# Patient Record
Sex: FEMALE | Race: WHITE | NOT HISPANIC OR LATINO | URBAN - METROPOLITAN AREA
[De-identification: names, ages, dates, MRNs, and addresses within clinical notes are randomized per-mention and may not be internally consistent; named-entity substitution may affect disease eponyms.]

---
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
## 2020-01-14 ENCOUNTER — COMMUNICATION - HEALTHEAST (OUTPATIENT)
Dept: PEDIATRICS | Facility: CLINIC | Age: 20
End: 2020-01-14

## 2020-01-14 DIAGNOSIS — F41.9 ANXIETY: ICD-10-CM

## 2020-01-14 RX ORDER — ESCITALOPRAM OXALATE 20 MG/1
20 TABLET ORAL DAILY
Qty: 90 TABLET | Refills: 0 | Status: SHIPPED | OUTPATIENT
Start: 2020-01-14

## 2020-04-02 ENCOUNTER — COMMUNICATION - HEALTHEAST (OUTPATIENT)
Dept: INTERNAL MEDICINE | Facility: CLINIC | Age: 20
End: 2020-04-02

## 2020-04-02 DIAGNOSIS — R41.840 CONCENTRATION DEFICIT: ICD-10-CM

## 2020-04-02 RX ORDER — DEXTROAMPHETAMINE SACCHARATE, AMPHETAMINE ASPARTATE MONOHYDRATE, DEXTROAMPHETAMINE SULFATE AND AMPHETAMINE SULFATE 1.25; 1.25; 1.25; 1.25 MG/1; MG/1; MG/1; MG/1
5 CAPSULE, EXTENDED RELEASE ORAL DAILY
Qty: 30 CAPSULE | Refills: 0 | Status: SHIPPED | OUTPATIENT
Start: 2020-04-02

## 2021-05-26 ASSESSMENT — PATIENT HEALTH QUESTIONNAIRE - PHQ9: SUM OF ALL RESPONSES TO PHQ QUESTIONS 1-9: 16

## 2021-05-28 ASSESSMENT — ANXIETY QUESTIONNAIRES: GAD7 TOTAL SCORE: 7

## 2021-05-30 VITALS — WEIGHT: 122 LBS | HEIGHT: 63 IN | BODY MASS INDEX: 21.62 KG/M2

## 2021-05-30 VITALS — WEIGHT: 123 LBS | BODY MASS INDEX: 21.96 KG/M2

## 2021-05-31 VITALS — BODY MASS INDEX: 22.14 KG/M2 | WEIGHT: 124 LBS

## 2021-06-01 VITALS — WEIGHT: 121.3 LBS | HEIGHT: 63 IN | BODY MASS INDEX: 21.49 KG/M2

## 2021-06-01 VITALS — WEIGHT: 121 LBS | BODY MASS INDEX: 21.44 KG/M2 | HEIGHT: 63 IN

## 2021-06-01 VITALS — BODY MASS INDEX: 22.69 KG/M2 | WEIGHT: 127.06 LBS

## 2021-06-01 NOTE — TELEPHONE ENCOUNTER
Medication Question or Clarification  Who is calling: Pharmacy: [facility]  What medication are you calling about? (include dose and sig) Escitalopram 20 mg, one tablet daily  Who prescribed the medication?: Nora Meza MD   What is your question/concern?: Could patient have a ninety day supply?  Pharmacy: [id number]  Okay to leave a detailed message?: Yes  Site CMT - Please call the pharmacy to obtain any additional needed information.

## 2021-06-01 NOTE — TELEPHONE ENCOUNTER
RN cannot approve Refill Request    RN can NOT refill this medication Protocol failed and NO refill given.         Christianne DOSHI Jeaneth, [facility] Triage/Med Refill 10/1/2019    Requested Prescriptions   Pending Prescriptions Disp Refills     escitalopram oxalate (LEXAPRO) 20 MG tablet 90 tablet 1     Sig: Take 1 tablet (20 mg total) by mouth daily.       SSRI Refill Protocol  Failed - 10/1/2019  3:35 PM        Failed - Age 21 and younger route to prescribing provider     Last office visit with prescriber/PCP: 1/10/2019 Nora Meza MD OR same dept: 1/10/2019 Nora Meza MD OR same specialty: 1/10/2019 Nora Meza MD  Last physical: 6/5/2018 Last MTM visit: Visit date not found   Next visit within 3 mo: Visit date not found  Next physical within 3 mo: Visit date not found  Prescriber OR PCP: Nora Meza MD  Last diagnosis associated with med order: 1. Anxiety  - escitalopram oxalate (LEXAPRO) 20 MG tablet; Take 1 tablet (20 mg total) by mouth daily.  Dispense: 90 tablet; Refill: 1    If protocol passes may refill for 12 months if within 3 months of last provider visit (or a total of 15 months).             Passed - PCP or prescribing provider visit in last year     Last office visit with prescriber/PCP: 1/10/2019 Nora Meza MD OR same dept: 1/10/2019 Nora Meza MD OR same specialty: 1/10/2019 Nora Meza MD  Last physical: 6/5/2018 Last MTM visit: Visit date not found   Next visit within 3 mo: Visit date not found  Next physical within 3 mo: Visit date not found  Prescriber OR PCP: Nora Meza MD  Last diagnosis associated with med order: 1. Anxiety  - escitalopram oxalate (LEXAPRO) 20 MG tablet; Take 1 tablet (20 mg total) by mouth daily.  Dispense: 90 tablet; Refill: 1    If protocol passes may refill for 12 months if within 3 months of last provider visit (or a total of 15 months).

## 2021-06-02 VITALS — BODY MASS INDEX: 22.09 KG/M2 | HEIGHT: 63 IN | WEIGHT: 124.7 LBS

## 2021-06-02 NOTE — PROGRESS NOTES
" [facility] Well Child Check    ASSESSMENT & PLAN  Chica Mckinney is a 19 y.o. who has normal growth and normal development.    Diagnoses and all orders for this visit:    Encounter for routine child health examination without abnormal findings  -     PHQ9 Depression Screen    Anxiety  Current mild episode of major depressive disorder without prior episode (H)  -     escitalopram oxalate (LEXAPRO) 20 MG tablet; Take 1 tablet (20 mg total) by mouth daily.  Dispense: 90 tablet; Refill: 0  -     Thyroid Stimulating Hormone (TSH)  -     T4, Free  - symptoms have worsened. Will check thyroid levels as advised by therapist.   -  Continue therapy  -   Advised change in medication. Barbra wants to wait a few months until she has a semester off as she has always been \"sensitive to meds.\"      Concentration deficit  Dyscalculia  Executive function deficit  -     dextroamphetamine-amphetamine (ADDERALL XR) 5 MG 24 hr capsule; Take 1 capsule (5 mg total) by mouth daily.  Dispense: 30 capsule; Refill: 0  -      Will fax 3 months  -     Thyroid Stimulating Hormone (TSH)  -     T4, Free  -     ARIANE signed for [facility] where she was previously getting her meds      Recurrent infections  -     HM1(CBC and Differential)  -     Immunoglobulin A  -     Immunoglobulin M  -     Immunoglobulin G    Uses oral contraception  -     norethindrone-ethinyl estradiol (JUNEL FE 1/20, 28,) 1 mg-20 mcg (21)/75 mg (7) per tablet; Take 1 tablet by mouth daily.  Dispense: 84 tablet; Refill: 4  -      ARIANE signed for [facility] for testing (chylamydia)     URI - advised to call if not improving in next week for possible sinus tx    Return to clinic within 5 months for a med check  or sooner as needed    IMMUNIZATIONS/LABS  Discussed need for HPV, Influenza, Hep A and option of Bexsero  We do not have record of Tdap - per previous visit - mom says she got this in [place] but we have not record/date  Chiac declined vaccines " "today    REFERRALS  Dental:  The patient has already established care with a dentist.  Other:  No referrals were made at this time.    ANTICIPATORY GUIDANCE  I have reviewed age appropriate anticipatory guidance.  Social:  Friends, Need for Privacy, Extracurricular Activities and Changes and Choices  Parenting:  Support, [place]/Dependence, Homework and Confidential Health Care  Nutrition:  Body Image  Play and Communication:  Appropriate Use of TV, Hobbies and Creative Talents  Safety:  Seat Belts, Swimming Safety, Contact Sports, Recreational vehicles, Bike/Motorcycle Helmets, Safe storage of Weapons and Outdoor Safety Avoiding Sun Exposure    HEALTH HISTORY  Do you have any concerns that you'd like to discuss today?: mood and sickness     Cold: She reports having recurrent colds since starting college last fall. Her current cold began 1 week ago. She endorses nasal symptoms and coughing. She denies having lingering coughs between colds. She has HSV fever blister last year. She had a breakout last cold season but has not had another since then. She had strep, an ear infection, \"flu\" last year. She is attempting to sleep 8 hours a night. She is living in a house with two other people. She denies having family history of immune issues.     Mood: She is struggling with school this year. She has been feeling overwhelmed and stressed with picking a major - currently undecided. She was close to being kicked out of school year. She was struggling with attending classes due to her mood. She was prescribed 5mg Adderall XR by [facility].  She typically doses  Adderall as needed for classes but is currently not taking it due to refill conflicts. She is having trouble getting an appointment at that clinic. She was told she could take 2 per day if needed. She is currently taking 20mg Lexapro with little improvement. She has been on that since 2/2016. She reports being sensitive to medications and is hesitant in " "switching to others. She is currently following a therapist, Elma Root, for this. They do tele-therapy. Lexapro was in her \"yellow zone\" on [email or web address] testing in the past.    Review of Systems:   Neuro: She is not taking 100mg gabapentin for her headaches. She reports not having headaches as much. She typically doses OTC meds for her migraines. She has been prescribed a migraine medication but is hesitant to use that.  Dymenorrhea: She had an IUD inserted recently but was taken out to due installation error. She currently does not have an IUD. She has been taking birth control pill since taking out the IUD. Her last cycle was longer - lasting 2 weeks. The first week was very heavy whereas she had brown discharge the 2nd week. She is seeing a gynecologist, Maria E Patel at [facility], for birth control. She states she recently had STI testing including chlamydia and had HIV testing in the past.  All other systems are negative.     Roomed by: JW< [facility]         Do you have any significant health concerns in your family history?: No  Family History   Problem Relation Age of Onset     Hypertension Other         grandparents     Leukemia Other         aunt     Since your last visit, have there been any major changes in your family, such as a move, job change, separation, divorce, or death in the family?: No  Has a lack of transportation kept you from medical appointments?: No    Home  Who lives in your home?:  See below   Social History     Patient does not qualify to have social determinant information on file (likely too young).   Social History Narrative    Lives with parents and younger brother, Lonny    Older sister in college     Do you have any concerns about losing your housing?: No  Is your housing safe and comfortable?: Yes  Do you have any trouble with sleep?:  Yes    Education  What school do you child attend?:  [employer]   What grade are you in?:  [employer]   How do you perform in " "school (grades, behavior, attention, homework?: \"Not doing very well\"  She does not have classes on Thursdays and Fridays.     Eating  Do you eat regular meals including fruits and vegetables?:  yes  What are you drinking (cow's milk, water, soda, juice, sports drinks, energy drinks, etc)?: water, juice and almond milk   Have you been worried that you don't have enough food?: No  Do you have concerns about your body or appearance?:  No    Activities  Do you have friends?:  yes  Do you get at least one hour of physical activity per day?:  yes  How many hours a day are you in front of a screen other than for schoolwork (computer, TV, phone)?:  5  What do you do for exercise?:  Walk   Do you have interest/participate in community activities/volunteers/school sports?:  no    MENTAL HEALTH SCREENING  PHQ-2 Total Score: 6 (10/24/2019  9:35 AM)  Depression Follow-up Plan: patient follow-up to return when and if necessary (10/24/2019  9:35 AM)    PHQ-9 Total Score: 16 (10/24/2019  9:35 AM) 16 increaesd from 9  Little interest or pleasure in doing things: Nearly every day  Feeling down, depressed, or hopeless: Nearly every day  Trouble falling or staying asleep, or sleeping too much: Nearly every day  Feeling tired or having little energy: Nearly every day  Poor appetite or overeating: Several days  Feeling bad about yourself - or that you are a failure or have let yourself or your family down: Several days  Trouble concentrating on things, such as reading the newspaper or watching television: More than half the days  Moving or speaking so slowly that other people could have noticed. Or the opposite - being so fidgety or restless that you have been moving around a lot more than usual: Not at all  Thoughts that you would be better off dead, or of hurting yourself in some way: Not at all  PHQ-9 Total Score: 16  If you checked off any problems, how difficult have these problems made it for you to do your work, take care of " things at home, or get along with other people?: Very difficult  Depression Follow-up Plan: patient follow-up to return when and if necessary     BASILIO-7 Screening Results: 7 increased from 2  How difficult did these problems make it for you to do your work, take care of things at home or get along with other people? : Very difficult (10/24/2019  9:00 AM)  Feeling nervous, anxious, or on edge: 3 (10/24/2019  9:00 AM)  Not being able to stop or control worryin (10/24/2019  9:00 AM)  Worrying too much about different things: 2 (10/24/2019  9:00 AM)  Trouble relaxin (10/24/2019  9:00 AM)  Being so restless that it's hard to sit still: 0 (10/24/2019  9:00 AM)  Becoming easily annoyed or irritable: 0 (10/24/2019  9:00 AM)  Feeling afraid as if something awful might happen: 0 (10/24/2019  9:00 AM)  BASILIO 7 Total Score: 7 (10/24/2019  9:00 AM)  How difficult did these problems make it for you to do your work, take care of things at home or get along with other people? : Very difficult (10/24/2019  9:00 AM)      VISION/HEARING  Vision: Patient is already followed by a vision specialist  Hearing:  Not done - normal 2018 at 18 year visit - no concerns    No exam data present    TB Risk Assessment:  The patient and/or parent/guardian answer positive to:  no known risk of TB    Dyslipidemia Risk Screening  Have either of your parents or any of your grandparents had a stroke or heart attack before age 55?: No  Any parents with high cholesterol or currently taking medications to treat?: Yes: Dad     Dental  When was the last time you saw the dentist?: over 12 months ago       Patient Active Problem List   Diagnosis     Anxiety     School avoidance     Executive function deficit     Dyscalculia     Concentration deficit     Depression     Migraine headache     Acne     Chronic fatigue       Drugs  Does the patient use tobacco/alcohol/drugs?:  no    Safety  Does the patient have any safety concerns (peer or home)?:  no  Does  "the patient use safety belts, helmets and other safety equipment?:  yes    Sex  Have you ever had sex?:  Yes - currently has one active partner - not in a relationship. Previous partners. She has been recently screened and negative for STIs.     MEASUREMENTS  Height:  5' 3\" (1.6 m)  Weight: 136 lb 4.8 oz (61.8 kg)  BMI: Body mass index is 24.14 kg/m .  Blood Pressure: 125/77  Blood pressure percentiles are not available for patients who are 18 years or older.    PHYSICAL EXAM  Constitutional: She appears well-developed and well-nourished. Somewhat tired appearing.  HEENT: Head: Normocephalic.    Right Ear: Tympanic membrane, external ear and canal normal.    Left Ear: Tympanic membrane, external ear and canal normal.    Nose: Nasal congestion. Mild maxillary tenderness.   Mouth/Throat: Mucous membranes are moist. Oropharynx is clear.    Eyes: Conjunctivae and lids are normal. Pupils are equal, round, and reactive to light.   Neck: Neck supple. No tenderness is present. Normal thyroid  Cardiovascular: Normal rate and regular rhythm. No murmur heard.  Pulses: Femoral pulses are 2+ bilaterally.   Pulmonary/Chest: Effort normal and breath sounds normal. There is normal air entry. Breast development is normal.  Thad stage 5.   Abdominal: Soft. There is no hepatosplenomegaly. No inguinal hernia.   Musculoskeletal: Normal range of motion. Normal strength and tone. No abnormalities. Spine is straight. Normal duck walk.  Normal heel to toe walk.   : deferred  Neurological: She is alert. She has normal reflexes. Gait normal.   Psychiatric: She has a normal mood and affect. Her speech is normal and behavior is normal.  Skin: No rashes. Mild inflammatory facial acne.    ADDITIONAL HISTORY SUMMARIZED (2): reviewed visits for strep/AOM  DECISION TO OBTAIN EXTRA INFORMATION (1): ARIANE signed for gyne and neuro records   RADIOLOGY TESTS (1): None.  LABS (1): Labs ordered today.  MEDICINE TESTS (1): PHQ9 administered.  INDEPENDENT " REVIEW (2 each): None.     The visit lasted a total of 46 minutes face to face with the patient. Over 25 minutes was spent in counseling regarding mood/attention, frequent infections.    I, Sandra Neal, am scribing for and in the presence of, Dr. Meza.    I, Dr. Meza, personally performed the services described in this documentation, as scribed by Sandra Neal in my presence, and it is both accurate and complete.    Total data points: 5

## 2021-06-03 VITALS
HEIGHT: 63 IN | BODY MASS INDEX: 24.15 KG/M2 | SYSTOLIC BLOOD PRESSURE: 125 MMHG | WEIGHT: 136.3 LBS | DIASTOLIC BLOOD PRESSURE: 77 MMHG

## 2021-06-05 NOTE — TELEPHONE ENCOUNTER
RN cannot approve Refill Request    RN can NOT refill this medication Protocol failed and NO refill given.      Christianne Eric, [facility] Triage/Med Refill 1/14/2020    Requested Prescriptions   Pending Prescriptions Disp Refills     escitalopram oxalate (LEXAPRO) 20 MG tablet 90 tablet 0     Sig: Take 1 tablet (20 mg total) by mouth daily.       SSRI Refill Protocol  Failed - 1/14/2020  7:21 AM        Failed - Age 21 and younger route to prescribing provider     Last office visit with prescriber/PCP: 1/10/2019 Nora Meza MD OR same dept: Visit date not found OR same specialty: 1/10/2019 Nora Meza MD  Last physical: 10/24/2019 Last MTM visit: Visit date not found   Next visit within 3 mo: Visit date not found  Next physical within 3 mo: Visit date not found  Prescriber OR PCP: Nora Meza MD  Last diagnosis associated with med order: 1. Anxiety  - escitalopram oxalate (LEXAPRO) 20 MG tablet; Take 1 tablet (20 mg total) by mouth daily.  Dispense: 90 tablet; Refill: 0    If protocol passes may refill for 12 months if within 3 months of last provider visit (or a total of 15 months).             Passed - PCP or prescribing provider visit in last year     Last office visit with prescriber/PCP: 1/10/2019 Nora Meza MD OR same dept: Visit date not found OR same specialty: 1/10/2019 Nora Meza MD  Last physical: 10/24/2019 Last MTM visit: Visit date not found   Next visit within 3 mo: Visit date not found  Next physical within 3 mo: Visit date not found  Prescriber OR PCP: Nora Meza MD  Last diagnosis associated with med order: 1. Anxiety  - escitalopram oxalate (LEXAPRO) 20 MG tablet; Take 1 tablet (20 mg total) by mouth daily.  Dispense: 90 tablet; Refill: 0    If protocol passes may refill for 12 months if within 3 months of last provider visit (or a total of 15 months).

## 2021-06-07 NOTE — TELEPHONE ENCOUNTER
I talked with Jonna and gave the message below from Dr. Gardner.  Jonna needs to talk with her mom and she will let us know what the plan is.

## 2021-06-07 NOTE — TELEPHONE ENCOUNTER
One month sent. She is due for a med check prior to additional refills.  This can be a phone visit.  It looks like she is transitioning care to [facility] in [place]. Please clarify who she would like to handle the Adderall refills in the future.

## 2021-06-07 NOTE — TELEPHONE ENCOUNTER
Refill request received from [facility] via fax for a refill of Adderall. Order pended.  Tosha Araiza CMA ............... 9:43 AM, 04/02/20

## 2021-06-08 NOTE — PROGRESS NOTES
Name: Chica Mckinney  Age: 16 y.o.  Gender: female  : 2000  Date of Encounter: 2017    ASSESSMENT/PLAN:  1. Anxiety - doing well  - escitalopram oxalate (LEXAPRO) 20 MG tablet; Take 1 tablet (20 mg total) by mouth daily.  Dispense: 90 tablet; Refill: 1  - med check in 6 months  - letter write to allow for possible extra time with [facility]    Told mom that we still do not have Chica's immunization record from previous clinic      Chief Complaint   Patient presents with     Anxiety     Depression       HPI:  Chica Mckinney is a 16 y.o.  female who presents to the clinic, accompanied by mom, to follow up on her anxiety and depression. She is currently taking 20 MG of Lexapro daily. Her current dosage is working well. She discontinued meeting with her psychologist, Elma Huerta. She is doing well academically. She has missed 11 school days this year, including 5 half days due to headaches. Her headaches are overall improved. She is taking 2 on-line classes and is otherwise in the classroom. 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    ROS:  MS: She had a muscle strain in her calf that has healed.   Neuro: Headaches  Psych:       Little interest or pleasure in doing things: Not at all  Feeling down, depressed, or hopeless: Not at all  Trouble falling or staying asleep, or sleeping too much: Not at all  Feeling tired or having little energy: Several days  Poor appetite or overeating: Several days  Feeling bad about yourself - or that you are a failure or have let yourself or your family down: Not at all  Trouble concentrating on things, such as reading the newspaper or watching television: Not at all  Moving or speaking so slowly that other people could have noticed. Or the opposite - being so fidgety or restless that you have been moving around a lot more than usual: Not at all  Thoughts that you would be better off dead, or of hurting yourself in some way: Not at all  PHQ-9  "Total Score: 2  If you checked off any problems, how difficult have these problems made it for you to do your work, take care of things at home, or get along with other people?: Not difficult at all    Feeling nervous, anxious or on edge: 1  Not being able to stop or control worry: 0  Worrying too much about different things: 0  Trouble relaxin  Being so restless that is is hard to sit still: 0  Becoming easily annnoyed or irritable: 0  Feeling afraid as if something awful might happen: 0  BASILIO-7 Total: 1  How difficult did these problems make it for you to do your work, take care of things at home or get along with other people? : Not difficult at all      Past Med / Surg History:  Past Medical History:   Diagnosis Date     Arm fracture, right      Concussion      EBV seropositivity      BASILIO (generalized anxiety disorder) 3rd grade    on lexapro until 8th grade     School avoidance 3rd grade     Strain of calf muscle 2016       Fam / Soc History:  She is a gwendolyn in high school. She is taking Faroese and English online.     She is involved in several extracurricular activities. She is a manager for boys basketball, student , and she plays soccer.     Family History   Problem Relation Age of Onset     Hypertension       grandparents     Leukemia       aunt     Social History     Social History Narrative    Lives with parents and younger brother, Lonny    Older sister in college       Objective:  Vitals:   Visit Vitals     /76 (Patient Site: Right Arm, Patient Position: Sitting, Cuff Size: Adult Regular)     Pulse 72     Ht 5' 2.75\" (1.594 m)     Wt 122 lb (55.3 kg)     BMI 21.78 kg/m2     Wt Readings from Last 3 Encounters:   17 122 lb (55.3 kg) (51 %, Z= 0.03)*   16 124 lb (56.2 kg) (58 %, Z= 0.20)*   16 124 lb (56.2 kg) (59 %, Z= 0.22)*     * Growth percentiles are based on CDC 2-20 Years data.       PHYSICAL EXAM:  Gen: Alert, well appearing  Heart: Regular rate and rhythm; " normal S1 and S2; no murmurs, gallops, or rubs.  Lungs: Unlabored respirations; clear breath sounds.  Neuro: Oriented. Appropriate for age.  Psychiatric: Appropriate affect      DATA REVIEWED: 3 data points  Additional History from Old Records Summarized (2): ortho note 9/2016  Decision to Obtain Records (1): None  Radiology Tests Summarized or Ordered (1): None  Labs Reviewed or Ordered (1): None  Medicine Test Summarized or Ordered (1): PHQ-9  Independent Review of EKG, X-RAY, or RAPID STREP (2 each): None    The visit lasted a total of 11 minutes face to face with the patient. Over 50% of the time was spent counseling and educating the patient about anxiety and depression.    I, Ingrid Dobbs, am scribing for and in the presence of, Dr. Meza.    I, Dr. Meza, personally performed the services described in this documentation, as scribed by Ingrid Dobbs in my presence, and it is both accurate and complete.    Nora Meza MD  2/9/2017

## 2021-06-08 NOTE — PROGRESS NOTES
Name: Chica Mckinney  Age: 16 y.o.  Gender: female  : 2000  Date of Encounter: 2/15/2017    ASSESSMENT:  1. Sore throat   - Mononucleosis Screen is negative  - Sayra-Barr Virus (EBV) Antibody Profile is pending    PLAN:  Discussed negative mono screen with mom over the phone. Recommend symptomatic cares as her EBV titers are pending. Recommend tylenol and motrin for sore throat and headaches. Push fluids. Rest.   Will call with EBV antibody results in the next few days. If IgM is negative, may consider treating for tonsillitis if symptoms are still not improving. Mom is understanding and agreeable with plan.        CHIEF COMPLAINT:  Chief Complaint   Patient presents with     Sore Throat     hs strep test 2 weeks ago, was seen at [facility] clinic, improved, sx came back with white spots     Headache     Fatigue       HPI:  Chica Mckinney is a 16 y.o.  female who presents to the clinic with mom with concerns for sore throat. Symptoms started a couple weeks ago with throat pain and nasal congestion. The congestion cleared, but she presented to the [facility] 2 weeks ago when she developed a sandpaper rash on her chest. Her rapid strep test came back negative at that time. Her throat pain cleared for a couple days, but has returned with white spots on her tonsils. She has a history of intermittent headaches, but currently has headaches that she describes as constant. She is taking 400 mg ibuprofen for headaches with little relief. She is also very fatigued, taking naps after school and getting to bed earlier than usual. No fever or chills. Her appetite is decreased. Drinking fluids well. Urinating well. No vomiting or diarrhea. She is a manager for the school basketball team and a boy on the team was recently diagnosed with mono.     Past Med / Surg History: She has a history of migraines and is taking LexaPro for Anxiety and Depression.     Fam / Soc History: She plays soccer in the fall. As reviewed  above.    ROS:  Gen: As reviewed above.  ENT:  As reviewed above.  Resp: No SOB or wheezing. No cough.  GI: No diarrhea. No nausea or vomiting.  MS: As reviewed above.  Skin: sand paper rash as reviewed above  Neuro: As reviewed above.      Objective:  Vitals:   Visit Vitals     BP (!) 86/60 (Patient Site: Right Arm, Patient Position: Sitting, Cuff Size: Adult Small)     Temp 98.2  F (36.8  C) (Oral)     Wt 123 lb (55.8 kg)     LMP 02/01/2017     BMI 21.96 kg/m2     Wt Readings from Last 3 Encounters:   02/15/17 123 lb (55.8 kg) (53 %, Z= 0.08)*   02/09/17 122 lb (55.3 kg) (51 %, Z= 0.03)*   07/05/16 124 lb (56.2 kg) (58 %, Z= 0.20)*     * Growth percentiles are based on [facility] 2-20 Years data.       Gen: Alert, well appearing.  Eyes: Conjunctivae clear bilaterally. PERRL. EOMI.   ENT: Left TM pearly gray with visible bony landmarks and light reflex. Right TM pearly gray with visible bony landmarks and light reflex. No nasal congestion. No presence of nasal drainage. Oropharynx normal. Tonsils 2.5+ bilaterally with exudate. Posterior pharynx erythematous. Mucosa moist and intact.  Heart: Regular rate and rhythm; normal S1 and S2; no murmurs.  Lungs: Unlabored respirations. Clear breath sounds throughout with good air movement. No wheezes, crackles, or rhonchi.  Abdomen: Bowel sounds present. Abdomen is non-distended. Abdomen is soft and non-tender to palpation. No hepatosplenomegaly. No masses.   Skin: Normal without rash, lesions, or bruising.  Neuro: Appropriate for age.  Hematologic/Lymph/Immune:  No cervical lymphadenopathy.      Pertinent results / imaging:  Recent Results (from the past 24 hour(s))   Mononucleosis Screen   Result Value Ref Range    Mono Screen Negative Negative     DATA REVIEWED:  Additional History from Old Records Summarized (2): None  Decision to Obtain Records (1): None  Radiology Tests Summarized or Ordered (1): None  Labs Reviewed or Ordered (1): Labs ordered.  Medicine Test Summarized or  Ordered (1): None  Independent Review of EKG, X-RAY, or RAPID STREP (2 each): None    The visit lasted a total of 13 minutes face to face with the patient. Over 50% of the time was spent counseling and educating the patient about throat pain.    IDalila, am scribing for and in the presence of, RICKY Mueller.    I, RICKY Mueller, personally performed the services described in this documentation, as scribed by Dalila Carrillo in my presence, and it is both accurate and complete.    RICKY Mueller  Certified Pediatric Nurse Practitioner  [facility] [[email or web address]]  [phone]    Total Data Points: 1

## 2021-06-10 NOTE — PROGRESS NOTES
Name: Chica Mckinney  Age: 17 y.o.  Gender: female  : 2000  Date of Encounter: 2017    ASSESSMENT/PLAN:  1. Gastritis  - omeprazole (PRILOSEC) 20 MG capsule; Take 1 capsule (20 mg total) by mouth daily.  Dispense: 30 capsule; Refill: 2    Patient Instructions   Prilosec OTC 20 mg daily  Should see improvement within 7-10 days  If not benefit or worsening in 2 weeks - check back  Can use Tums 4 times daily as needed  If helpful, use for 4-6 week for healing, up to 2-3 months  Discussed abdominal US (r/o gallstones) lab work if persisting        Chief Complaint   Patient presents with     Abdominal Pain     decreased appetite past few weeks     Nausea     Nasal Congestion     X 3 weeks ago, improved       HPI:  Chica Mckinney is a 17 y.o.  female who presents to the clinic with her mother today with concerns of loss of appetite and nausea over the last month or so.  She comments that she had a bad cold about 3 weeks ago.  Her symptoms have resolved but she continues to feel more tired and her appetite has been decreased since that time.  She states in general food just does not sound good.  She will be a little bit more hungry around dinnertime.  Her stomach has been bothering her and does feel a bit worse after eating.  She has not had any vomiting but has felt somewhat nauseated.  She does have a tendency to have some constipation.  She has been in general stooling on a daily basis and she said that her stool was somewhat soft yesterday.  She did have some brief diarrhea one time recently.  She is not using any laxatives but has used MiraLAX in the past.  He does occasionally feel fluid coming back up into her throat from her stomach that she usually just swallows back.  She has not noticed any blood in her stool. She has taken some Tums without much improvement.    She has been more tired since her cold.  She has been taking a nap after school most days.  Her sleep overnight has been a bit  more restless.  She stayed home from school today because she did not feel well but this is the first day of school she is missed in a while.  She does continue to have some occasional headaches a few times per month but do not seem to cause much dysfunction.  She continues on Lexapro 20 mg daily.  She feels like her anxiety and mood overall has been well controlled. Mom does comment that there is some end of the year stress.    ROS:  Gen: No fever   Eyes: no eye itching  ENT: No nasal congestion or rhinorrhea. No pharyngitis.   Resp: No SOB, cough or wheezing.  GI: See HPI  :No dysuria. No hematuria. Regular menses - last was a bit lighter than usual at start  MS: Some back pain - chronic from prior injury  Neuro: See HPI  Psych: See HPI  No Known Allergies. No seasonal allergies    Past Med / Surg History:  Past Medical History:   Diagnosis Date     Arm fracture, right 2003     Concussion      EBV seropositivity      BASILIO (generalized anxiety disorder) 3rd grade    on lexapro until 8th grade     School avoidance 3rd grade     Strain of calf muscle 09/2016     Was on PPI around age 10 due to stomach issues - did seem to help  Did have a barium swallow - inconclusive for ZHANE    Fam / Soc History:  Hanging out this summer - no camp or sports  She has one on-line class    Family History   Problem Relation Age of Onset     Hypertension       grandparents     Leukemia       aunt     Social History     Social History Narrative    Lives with parents and younger brother, Lonny    Older sister in college       Objective:  Vitals: BP 98/70 (Patient Site: Right Arm, Patient Position: Sitting, Cuff Size: Adult Regular)  Pulse 80  Temp 98.5  F (36.9  C) (Oral)   Wt 124 lb (56.2 kg)  LMP 04/24/2017  Wt Readings from Last 3 Encounters:   05/23/17 124 lb (56.2 kg) (54 %, Z= 0.09)*   02/15/17 123 lb (55.8 kg) (53 %, Z= 0.08)*   02/09/17 122 lb (55.3 kg) (51 %, Z= 0.03)*     * Growth percentiles are based on CDC 2-20 Years data.        PHYSICAL EXAM:  Gen: Alert, somewhat fatigued  ENT: No nasal congestion or rhinorrhea. Oropharynx normal, moist mucosa.  TMs normal bilaterally. No facial tenderness  Eyes: Conjunctivae clear bilaterally.   Heart: Regular rate and rhythm; normal S1 and S2; no murmurs, gallops, or rubs.  Lungs: Unlabored respirations; clear breath sounds.  Abdomen: Soft, without organomegaly. Bowel sounds normal. Minimal LUQ tenderness. No masses palpable. No distention.  Musculoskeletal: No CVA tenderness  Skin: Mild acne  Neuro: Oriented.Appropriate for age.  Hematologic/Lymph/Immune: No cervical lymphadenopathy  Psychiatric: flatter affect    Pertinent results / imaging:  Results for orders placed or performed in visit on 02/15/17   Mononucleosis Screen   Result Value Ref Range    Mono Screen Negative Negative   Sayra-Barr Virus (EBV) Antibody Profile   Result Value Ref Range    EBV VCA IGM AB, S Negative Negative    EBV VCA IGG AB, S Positive Negative    EBNA AB, S Positive Negative    INTERPRETATION Results suggest past infection.        DATA REVIEWED:  Additional History from Old Records Summarized (2): 2/15/17 visit  Decision to Obtain Records (1): None  Radiology Tests Summarized or Ordered (1): None  Labs Reviewed or Ordered (1): mono testing above  Medicine Test Summarized or Ordered (1): None  Independent Review of EKG, X-RAY, or RAPID STREP (2 each): None    The visit lasted a total of 26 minutes face to face with the patient. Over 50% of the time was spent counseling and educating the patient about abdominal pain.        Nora Meza MD  5/23/2017

## 2021-06-17 NOTE — PATIENT INSTRUCTIONS - HE
Patient Instructions by Nora Meza MD at 10/24/2019  9:20 AM     Author: Nora Meza MD Service: -- Author Type: Physician    Filed: 10/24/2019 10:15 AM Encounter Date: 10/24/2019 Status: Addendum    : Nora Meza MD (Physician)    Related Notes: Original Note by Nroa Meza MD (Physician) filed at 10/24/2019 10:11 AM          Patient Education      [facility] HANDOUT- PATIENT  18 THROUGH 21 YEAR VISITS  Here are some suggestions from [employer] experts that may be of value to your family.     HOW YOU ARE DOING  Enjoy spending time with your family.  Find activities you are really interested in, such as sports, theater, or volunteering.  Try to be responsible for your schoolwork or work obligations.  Always talk through problems and never use violence.  If you get angry with someone, try to walk away.  If you feel unsafe in your home or have been hurt by someone, let us know. Hotlines and community agencies can also provide confidential help.  Talk with us if you are worried about your living or food situation. Community agencies and programs such as SNAP can help.  Dont smoke, vape, or use drugs. Avoid people who do when you can. Talk with us if you are worried about alcohol or drug use in your family.    YOUR DAILY LIFE  Visit the dentist at least twice a year.  Brush your teeth at least twice a day and floss once a day.  Be a healthy eater.  Have vegetables, fruits, lean protein, and whole grains at meals and snacks.  Limit fatty, sugary, salty foods that are low in nutrients, such as candy, chips, and ice cream.  Eat when youre hungry. Stop when you feel satisfied.  Eat breakfast.  Drink plenty of water.  Make sure to get enough calcium every day.  Have 3 or more servings of low-fat (1%) or fat-free milk and other low-fat dairy products, such as yogurt and cheese.  Women: Make sure to eat foods rich in folate, such as fortified grains and dark- green leafy vegetables.  Aim for  at least 1 hour of physical activity every day.  Wear safety equipment when you play sports.  Get enough sleep.  Talk with us about managing your health care and insurance as an adult.    YOUR FEELINGS  Most people have ups and downs. If you are feeling sad, depressed, nervous, irritable, hopeless, or angry, let us know or reach out to another health care professional.  Figure out healthy ways to deal with stress.  Try your best to solve problems and make decisions on your own.  Sexuality is an important part of your life. If you have any questions or concerns, we are here for you.    HEALTHY BEHAVIOR CHOICES  Avoid using drugs, alcohol, tobacco, steroids, and diet pills. Support friends who choose not to use.  If you use drugs or alcohol, let us know or talk with another trusted adult about it. We can help you with quitting or cutting down on your use.  Make healthy decisions about your sexual behavior.  If you are sexually active, always practice safe sex. Always use birth control along with a condom to prevent pregnancy and sexually transmitted infections.  All sexual activity should be something you want. No one should ever force or try to convince you.  Protect your hearing at work, home, and concerts. Keep your earbud volume down.    STAYING SAFE  Always be a safe and cautious .  Insist that everyone use a lap and shoulder seat belt.  Limit the number of friends in the car and avoid driving at night.  Avoid distractions. Never text or talk on the phone while you drive.  Do not ride in a vehicle with someone who has been using drugs or alcohol.  If you feel unsafe driving or riding with someone, call someone you trust to drive you.  Wear helmets and protective gear while playing sports. Wear a helmet when riding a bike, a motorcycle, or an ATV or when skiing or skateboarding.  Always use sunscreen and a hat when youre outside.  Fighting and carrying weapons can be dangerous. Talk with your parents,  teachers, or doctor about how to avoid these situations.      Helpful Resources:  National Domestic Violence Hotline: 990.533.5655   Consistent with Bright Futures: Guidelines for Health Supervision of Infants, Children, and Adolescents, 4th Edition  For more information, go to https://brightfutures.aap.org.           Adderall XR 5 mg - sent today with fill dates of 10/24, 11/23, 12/23    Return to discuss switch off of lexapro

## 2021-06-17 NOTE — PROGRESS NOTES
CONFIDENTIAL ADOLESCENT VISIT - DO NOT SHARE TEST RESULTS OR INFORMATION WITH PATIENT'S PARENTS - [facility] CELL PHONE -[phone]       Name: Chica Mckinney  Age: 18 y.o.  Gender: female  : 2000  Date of Encounter: 2018    ASSESSMENT:  1. Frequent urination - UA with trace LE, otherwise normal. UC in process. Wet prep is negative. Pregnancy test negative. Chlamydia trachomatis & Neisseria gonorrhoeae screen in process.       PLAN:  UA is not indicative of UTI. Discussed possible vulvovaginitis and recommend supportive cares while UC is pending.   Continue to push fluids.  Tylenol or advil for fever or pain.    Should be seen again if develops fevers (temp >100.8), worsening of urine symptoms including hematuria, worsening of diarrhea or vomiting.   Please call Jonna's cell phone if UC or STI screening is positive: [facility] CELL PHONE -[phone]           CHIEF COMPLAINT:  Chief Complaint   Patient presents with     possible uti     for two days, irritation, not feeling right, waking up sweating        HPI:  Chica Mckinnye is a 18 y.o.  female who presents to the clinic for evaluation of a possible UTI. Over the past 3 days she has had increased urinary frequency and irritation. Has been sweaty overnight, but no known fever or chills. No hematuria. Has had urinary hesitancy. Has intermittent lower abdomen cramping. Had 2 loose stools yesterday. Felt nauseated, but no vomiting. Is eating and drinking well. Is sexually active and most recently had intercourse 1 week ago. She did not urinate after intercourse. Is on OCPs and uses a condom for birth control and prevention of STI. Has one male partner. Last STI testing was 1 year ago at her gynecologist's office. Most recent period was 2 weeks ago. Denies itching. No changes or odorous discharge. No history of UTI's. Has not taken medication for discomfort with urination.     Past Med / Surg History:   Patient Active Problem List   Diagnosis      Anxiety     School avoidance     Executive function deficit     Dyscalculia     Concentration deficit     Depression     Migraine headache     Acne     Fam / Soc History: as reviewed    ROS:  Gen: No fever or fatigue  ENT:  No pharyngitis.   Resp:  No cough.  GI: as reviewed above  : as reviewed above  Skin: No rashes  Neuro: history of migraines      Objective:  Vitals: BP 95/60 (Patient Site: Right Arm, Patient Position: Sitting)  Pulse 68  Temp 98.7  F (37.1  C) (Oral)   Resp 20  Wt 127 lb 1 oz (57.6 kg)  SpO2 99%  Wt Readings from Last 3 Encounters:   04/13/18 127 lb 1 oz (57.6 kg) (56 %, Z= 0.14)*   05/23/17 124 lb (56.2 kg) (54 %, Z= 0.09)*   02/15/17 123 lb (55.8 kg) (53 %, Z= 0.08)*     * Growth percentiles are based on [facility] 2-20 Years data.       Gen: Alert, well appearing  Eyes: Conjunctivae clear bilaterally.  PERRL.  EOMI.   ENT: Left TM pearly gray with visible bony landmarks and light reflex.  Right TM pearly gray with visible bony landmarks and light reflex.  No nasal congestion.  No presence of nasal drainage.  Oropharynx normal.  Posterior pharynx without erythema, swelling, or exudate.  Mucosa moist and intact.  Heart: Regular rate and rhythm; normal S1 and S2; no murmurs.  Lungs: Unlabored respirations.  Clear breath sounds throughout with good air movement.  No wheezes, crackles, or rhonchi.  Abdomen: Bowel sounds present.  Abdomen is non-distended.  Abdomen is soft and non-tender to palpation.  No hepatosplenomegaly.  No masses. No CVA tenderness.   Skin: Normal without rash, lesions, or bruising.  Neuro:  Appropriate for age.  Hematologic/Lymph/Immune:  No cervical lymphadenopathy    Pertinent results / imaging:  Recent Results (from the past 24 hour(s))   Urinalysis   Result Value Ref Range    Color, UA Yellow Colorless, Yellow, Straw, Light Yellow    Clarity, UA Clear Clear    Glucose, UA Negative Negative    Bilirubin, UA Negative Negative    Ketones, UA Negative Negative     Specific Gravity, UA 1.015 1.005 - 1.030    Blood, UA Negative Negative    pH, UA 7.0 5.0 - 8.0    Protein, UA Negative Negative mg/dL    Urobilinogen, UA 0.2 E.U./dL 0.2 E.U./dL, 1.0 E.U./dL    Nitrite, UA Negative Negative    Leukocytes, UA Trace (!) Negative    Bacteria, UA Moderate (!) None Seen hpf    RBC, UA 0-2 None Seen, 0-2 hpf    WBC, UA 0-5 None Seen, 0-5 hpf    Squam Epithel, UA 10-25 (!) None Seen, 0-5 lpf    Mucus, UA Few (!) None Seen lpf   Pregnancy, Urine   Result Value Ref Range    Pregnancy Test, Urine Negative Negative    Specific Gravity, UA 1.015 1.001 - 1.030   Wet Prep, Vaginal   Result Value Ref Range    Yeast Result No yeast seen No yeast seen    Trichomonas No Trichomonas seen No Trichomonas seen    Clue Cells, Wet Prep No Clue cells seen No Clue cells seen         RICKY Mueller  Certified Pediatric Nurse Practitioner  [facility]  [phone]

## 2021-06-18 NOTE — PROGRESS NOTES
[facility] Well Child Check    ASSESSMENT & PLAN  Chica Mckinney is a 18 y.o. who has normal growth and normal development.    Diagnoses and all orders for this visit:    Encounter for routine child health examination without abnormal findings    Encounter for annual health examination  -     PHQ9 Depression Screen  -     Hearing Screening  -     Lipid Cascade FASTING  -     Hemoglobin    Anxiety - doing well  -     escitalopram oxalate (LEXAPRO) 20 MG tablet; Take 1 tablet (20 mg total) by mouth daily.  Dispense: 90 tablet; Refill: 1  -     Med check in about 6 months when home on college break    Amenorrhea - likely side effect of low estrogen OCP  -     Pregnancy (Beta-hCG, Qual), Urine      Return to clinic in 1 year for a Well Child Check or sooner as needed    IMMUNIZATIONS/LABS  I have discussed the risks and benefits of all of the vaccine components with the patient/parents.  All questions have been answered.    REFERRALS  Dental:  Recommend routine dental care as appropriate., The patient has already established care with a dentist.  Other:  No additional referrals were made at this time.    ANTICIPATORY GUIDANCE  I have reviewed age appropriate anticipatory guidance.    HEALTH HISTORY  Do you have any concerns that you'd like to discuss today?: No concerns    Anxiety: She continues to dose Lexapro 20 mg daily. She denies adverse side effects. She reports that her anxious symptoms are very well controlled. She does want to continue medication as she transitions to college.  School avoidance: She had good attendance in her senior year and was comfortable in the classroom. She does not experience much stress surrounding tests and big assignments. She is looking forward to attending college in the fall.     Migraines: She reports seeing a neurologist recently for evaluation of migraines, who prescribed gabapentin 100 mg. She reports great improvement in frequency of migraines with dosing. She does not  remember the name of the neurologist.     She follows with a gynecologist at [facility]. She doses Junel Fe OCP consistently. She reports very light periods with dosing. Last month she did not have a period. She is sexually active. She says she had HIV testing at that clinic.     Acne - She has seen a dermatologist in the past. She is not using anything currently for acne and denies need for this.    Little interest or pleasure in doing things: Not at all  Feeling down, depressed, or hopeless: Not at all  Trouble falling or staying asleep, or sleeping too much: Not at all  Feeling tired or having little energy: Not at all  Poor appetite or overeating: Not at all  Feeling bad about yourself - or that you are a failure or have let yourself or your family down: Not at all  Trouble concentrating on things, such as reading the newspaper or watching television: Several days  Moving or speaking so slowly that other people could have noticed. Or the opposite - being so fidgety or restless that you have been moving around a lot more than usual: Not at all  Thoughts that you would be better off dead, or of hurting yourself in some way: Not at all  PHQ-9 Total Score: 1  If you checked off any problems, how difficult have these problems made it for you to do your work, take care of things at home, or get along with other people?: Not difficult at all   BASILIO-7 Screening Results:  Feeling nervous, anxious or on edge: 0  Not being able to stop or control worry: 0  Worrying too much about different things: 1  Trouble relaxin  Being so restless that is is hard to sit still: 0  Becoming easily annnoyed or irritable: 0  Feeling afraid as if something awful might happen: 0  BASILIO-7 Total: 1  How difficult did these problems make it for you to do your work, take care of things at home or get along with other people? : Not difficult at all      No question data found.    PFSH:  Social: She works at [employer]  and will attend [employer]  in the fall. She broke up with her boyfriend several weeks ago, but she is content with the situation. She will attend a [employer] camp in July. She does not play soccer anymore.   Do you have any significant health concerns in your family history?: No  Family History   Problem Relation Age of Onset     Hypertension       grandparents     Leukemia       aunt     Since your last visit, have there been any major changes in your family, such as a move, job change, separation, divorce, or death in the family?: No  Has a lack of transportation kept you from medical appointments?: No    Home  Who lives in your home?:  Mother, Dad, Sister and Brother  Social History     Social History Narrative    Lives with parents and younger brother, Lonny    Older sister in college     Do you have any concerns about losing your housing?: No  Is your housing safe and comfortable?: Yes  Do you have any trouble with sleep?:  No    Education  What school do you child attend?:  [employer]  What grade are you in?:  graduated this year  How do you perform in school (grades, behavior, attention, homework?: Good     Eating  Do you eat regular meals including fruits and vegetables?:  yes  What are you drinking (cow's milk, water, soda, juice, sports drinks, energy drinks, etc)?: water, juice and Fare Life Milk  Have you been worried that you don't have enough food?: No  Do you have concerns about your body or appearance?:  No    Activities  Do you have friends?:  yes  Do you get at least one hour of physical activity per day?:  yes  How many hours a day are you in front of a screen other than for schoolwork (computer, TV, phone)?:  5  What do you do for exercise?:  Walks  Do you have interest/participate in community activities/volunteers/school sports?:  no    MENTAL HEALTH SCREENING  PHQ-2 Total Score: 0 (6/5/2018 10:00 AM)  PHQ-9 Total Score: 1 (6/5/2018 10:00 AM)    VISION/HEARING  Vision: Patient is  "already followed by a vision specialist She follows annually.   Hearing:  Completed. See Results     Hearing Screening    125Hz 250Hz 500Hz 1000Hz 2000Hz 3000Hz 4000Hz 6000Hz 8000Hz   Right ear:   20 20 20  20 20 20   Left ear:   20 20 20  20 20 20       TB Risk Assessment:  The patient and/or parent/guardian answer positive to:  has been in contact with someone known to have TB    Dyslipidemia Risk Screening  Have either of your parents or any of your grandparents had a stroke or heart attack before age 55?: No  Any parents with high cholesterol or currently taking medications to treat?: No     Dental  When was the last time you saw the dentist?: 0-3 months ago       Patient Active Problem List   Diagnosis     Anxiety     School avoidance     Executive function deficit     Dyscalculia     Concentration deficit     Depression     Migraine headache     Acne       Drugs  Does the patient use tobacco/alcohol/drugs?:  no      Sex  Have you ever had sex?:  Yes  She has had multiple partners (male)    MEASUREMENTS  Height:  5' 2.84\" (1.596 m)  Weight: 121 lb (54.9 kg)  BMI: Body mass index is 21.55 kg/(m^2).  Blood Pressure: 102/64  Blood pressure percentiles are 22 % systolic and 46 % diastolic based on NHBPEP's 4th Report. Blood pressure percentile targets: 90: 124/79, 95: 127/83, 99 + 5 mmH/96.    PHYSICAL EXAM  Constitutional: She appears well-developed and well-nourished.   HEENT: Head: Normocephalic.    Right Ear: Tympanic membrane, external ear and canal normal.    Left Ear: Tympanic membrane, external ear and canal normal.    Nose: Nose normal.    Mouth/Throat: Mucous membranes are moist. Dentition is normal. Oropharynx is clear.    Eyes: Conjunctivae and lids are normal. Red reflex is present bilaterally. Pupils are equal, round, and reactive to light.   Neck: Neck supple. No tenderness is present.   Cardiovascular: Normal rate and regular rhythm. No murmur heard.  Pulses: Femoral pulses are 2+ bilaterally. "   Pulmonary/Chest: Effort normal and breath sounds normal. There is normal air entry. Thad stage is V.   Abdominal: Soft. Bowel sounds are normal. There is no hepatosplenomegaly. No umbilical or inguinal hernia.   Genitourinary: Normal external female genitalia. Thad stage is IV.   Musculoskeletal: Normal range of motion. Normal strength and tone. Minimal lumbar curve.   Neurological: She is alert. She has normal reflexes. No cranial nerve deficit.   Skin: No rashes. Mild inflammatory acne.    ADDITIONAL HISTORY SUMMARIZED (2): Reviewed 4/13/18 note regarding UA with trace LE.   DECISION TO OBTAIN EXTRA INFORMATION (1): None.   RADIOLOGY TESTS (1): None.  LABS (1): Reviewed 4/13/18 urine culture. Ordered lipid panel and hemoglobin today.   MEDICINE TESTS (1): Administered PHQ-9.   INDEPENDENT REVIEW (2 each): None.   Total Data Points: 4.     The visit lasted a total of 23 minutes face to face with the patient. Over 50% of the time was spent counseling and educating the patient about wellness.    I, Marion Fulton, am scribing for and in the presence of, Dr. Nora Meza.    I, Dr. Meza, personally performed the services described in this documentation, as scribed by Marion Fulton in my presence, and it is both accurate and complete.

## 2021-06-19 NOTE — LETTER
Letter by Nora Meza MD at      Author: Nora Meza MD Service: -- Author Type: --    Filed:  Encounter Date: 10/25/2019 Status: Signed         Chica Mckinney  [address] [facility] [address] [place] [address]             October 25, 2019         Dear Chica,    Below are the results from your recent visit:    Resulted Orders   Thyroid Stimulating Hormone (TSH)   Result Value Ref Range    TSH 2.26 0.30 - 5.00 uIU/mL   T4, Free   Result Value Ref Range    Free T4 1.2 0.7 - 1.8 ng/dL   Immunoglobulin A   Result Value Ref Range    Immunoglobulin A 134 65 - 400 mg/dL   Immunoglobulin M   Result Value Ref Range    Immunoglobulin M 76 60 - 280 mg/dL   Immunoglobulin G   Result Value Ref Range    Immunoglobulin G 1,020 700-1,700 mg/dL   HM1 (CBC with Diff)   Result Value Ref Range    WBC 7.5 4.0 - 11.0 thou/uL    RBC 4.46 3.80 - 5.40 mill/uL    Hemoglobin 14.5 12.0 - 16.0 g/dL    Hematocrit 42.2 35.0 - 47.0 %    MCV 95 80 - 100 fL    MCH 32.5 27.0 - 34.0 pg    MCHC 34.4 32.0 - 36.0 g/dL    RDW 10.7 (L) 11.0 - 14.5 %    Platelets 246 140 - 440 thou/uL    MPV 6.9 (L) 7.0 - 10.0 fL    Neutrophils % 63 50 - 70 %    Lymphocytes % 25 20 - 40 %    Monocytes % 7 2 - 10 %    Eosinophils % 4 0 - 6 %    Basophils % 1 0 - 2 %    Neutrophils Absolute 4.7 2.0 - 7.7 thou/uL    Lymphocytes Absolute 1.9 0.8 - 4.4 thou/uL    Monocytes Absolute 0.5 0.0 - 0.9 thou/uL    Eosinophils Absolute 0.3 0.0 - 0.4 thou/uL    Basophils Absolute 0.0 0.0 - 0.2 thou/uL       Lab results look good. Thyroid level, antibody levels and complete blood count are all normal.    Please call with questions or contact us using [employer].    Sincerely,        Electronically signed by Nora Meza MD

## 2021-06-19 NOTE — LETTER
Letter by Nora Meza MD at      Author: Nora Meza MD Service: -- Author Type: --    Filed:  Encounter Date: 7/23/2019 Status: (Other)         Chica Mckinney  [address] [facility] [address]  [place] [facility] [address]    7/23/2019      To Parents of Chica:      We've noticed your child hasn't been in to our clinic for a check up for some time. We would like to see Chica because regular check ups are an important part of maintaining health. Your child may also need an update on vaccinations. Please make an appointment with your primary care provider at your earliest convenience.     If you have any questions or concerns, please contact us at [phone] or via [employer].        Thank you,    Nora Meza MD

## 2021-06-23 NOTE — PROGRESS NOTES
"Name: Chica Mckinney  Age: 18 y.o.  Gender: female  : 2000  Date of Encounter: 1/10/2019    ASSESSMENT/PLAN:  1. Anxiety - doing well  - escitalopram oxalate (LEXAPRO) 20 MG tablet; Take 1 tablet (20 mg total) by mouth daily.  Dispense: 90 tablet; Refill: 1  - continue current dose, med check within 6 months - discussed wellness exam at this time   - reviewed sleep issues/solutions - monitor mood contributing to this    Chief Complaint   Patient presents with     Anxiety     med check - lexapro is working well     Depression       HPI:  Chica Mckinney is a 18 y.o.  female who presents to the clinic for an anxiety/depression medication check. She began dosing Lexapro in 2016. She was last seen in clinic 18, at which time she was tolerating her Lexapro 20 mg dose well. She did report some fatigue with sore throat at the time. She continues to tolerate this dose well. She endorses low appetite but weight has been stable and she does not think this is a big concern. She has trouble staying asleep overnight. She wakes up very early in the morning and cannot return to sleep. She takes long naps during the day. Her sleep schedule at school is fairly regular compared to her schedule over winter/summer breaks. Her stress level at school has been manageable. Her grades were \"okay\" last semester. She is doing well socially at school. Her roommate is a friend from high school. She reports frequent cold symptoms this winter.     Her neurologist prescribes her Adderall, which she started dosing last . She typically only doses on days she has class. She reports frequent headaches recently. She wants to wean off of gabapentin, as she does not feel like it is as helpful as it used to be. She plans to meet with her neurologist soon to discuss weaning.     ROS:  Gen: Positive for fatigue.   Neuro: Positive for headaches.   See pertinent positives in the HPI.     BASILIO-7 Screening Results:  How difficult " did these problems make it for you to do your work, take care of things at home or get along with other people? : Not difficult at all (1/10/2019  2:00 PM)  Feeling nervous, anxious, or on edge: 1 (1/10/2019  2:00 PM)  Not being able to stop or control worryin (1/10/2019  2:00 PM)  Worrying too much about different things: 1 (1/10/2019  2:00 PM)  Trouble relaxin (1/10/2019  2:00 PM)  Being so restless that it's hard to sit still: 0 (1/10/2019  2:00 PM)  Becoming easily annoyed or irritable: 0 (1/10/2019  2:00 PM)  Feeling afraid as if something awful might happen: 0 (1/10/2019  2:00 PM)  BASILIO 7 Total Score: 2 (1/10/2019  2:00 PM)  How difficult did these problems make it for you to do your work, take care of things at home or get along with other people? : Not difficult at all (1/10/2019  2:00 PM)  PHQ-9 Results:  Little interest or pleasure in doing things: Not at all  Feeling down, depressed, or hopeless: Several days  Trouble falling or staying asleep, or sleeping too much: Nearly every day  Feeling tired or having little energy: More than half the days  Poor appetite or overeating: More than half the days  Feeling bad about yourself - or that you are a failure or have let yourself or your family down: Not at all  Trouble concentrating on things, such as reading the newspaper or watching television: Several days  Moving or speaking so slowly that other people could have noticed. Or the opposite - being so fidgety or restless that you have been moving around a lot more than usual: Not at all  Thoughts that you would be better off dead, or of hurting yourself in some way: Not at all  PHQ-9 Total Score: 9  If you checked off any problems, how difficult have these problems made it for you to do your work, take care of things at home, or get along with other people?: Somewhat difficult  Increased from 18    Past Med / Surg History:  Past Medical History:   Diagnosis Date     Arm fracture, right       "Concussion      EBV seropositivity      BASILIO (generalized anxiety disorder) 3rd grade    on lexapro until 8th grade     School avoidance 3rd grade     Strain of calf muscle 09/2016     Past Surgical History:   Procedure Laterality Date     NO PAST SURGERIES         Fam / Soc History:  Social: She attends [employer].     Family History   Problem Relation Age of Onset     Hypertension Unknown         grandparents     Leukemia Unknown         aunt     Social History     Social History Narrative    Lives with parents and younger brother, Lonny    Older sister in college       Objective:  Vitals: BP 98/70   Pulse 68   Ht 5' 2.5\" (1.588 m)   Wt 124 lb 11.2 oz (56.6 kg)   LMP 12/12/2018   BMI 22.44 kg/m    Wt Readings from Last 3 Encounters:   01/10/19 124 lb 11.2 oz (56.6 kg) (47 %, Z= -0.06)*   08/09/18 121 lb 4.8 oz (55 kg) (43 %, Z= -0.19)*   06/05/18 121 lb (54.9 kg) (43 %, Z= -0.18)*     * Growth percentiles are based on CDC (Girls, 2-20 Years) data.     PHYSICAL EXAM:  Gen: Alert, well appearing  ENT: Oropharynx normal, moist mucosa. 2+ tonsils  Eyes: Conjunctivae clear bilaterally.   Heart: Regular rate and rhythm; normal S1 and S2; no murmurs, gallops, or rubs.  Lungs: Unlabored respirations; clear breath sounds.  Neuro: Oriented.  Appropriate for age.   Skin: Inflammatory acne on face.   Psychiatric: Appropriate affect.         DATA REVIEWED:  Additional History from Old Records Summarized (2): Reviewed 8/8 note regarding pharyngitis.   Decision to Obtain Records (1): None  Radiology Tests Summarized or Ordered (1): None  Labs Reviewed or Ordered (1): None.  Medicine Test Summarized or Ordered (1): Administered PHQ-9 today.   Independent Review of EKG, X-RAY, or RAPID STREP (2 each): None    The visit lasted a total of 16 minutes face to face with the patient. Over 50% of the time was spent counseling and educating the patient about her medication.    Marion DENISE, am scribing for and in the " presence of, Dr. Nora Meza.    I, Dr. Nora Meza, personally performed the services described in this documentation, as scribed by Marion Fulton in my presence, and it is both accurate and complete.    Total Data Points: 3.     Nora Meza MD  1/10/2019

## 2021-07-03 ENCOUNTER — COMMUNICATION - HEALTHEAST (OUTPATIENT)
Dept: PEDIATRICS | Facility: CLINIC | Age: 21
End: 2021-07-03

## 2021-07-03 DIAGNOSIS — F41.9 ANXIETY: ICD-10-CM

## 2021-07-04 NOTE — TELEPHONE ENCOUNTER
Telephone Encounter by Stella Ahuja RN at 7/3/2021  3:25 PM     Author: Stella Ahuja RN Service: -- Author Type: Registered Nurse    Filed: 7/3/2021  3:26 PM Encounter Date: 7/3/2021 Status: Signed    : Stella Ahuja RN (Registered Nurse)       RN cannot approve Refill Request    RN can NOT refill this medication PCP messaged that patient is overdue for Office Visit. Last office visit: 1/10/2019 Nora Meza MD Last Physical: 10/24/2019 Last MTM visit: Visit date not found Last visit same specialty: 1/10/2019 Nora Meza MD.  Next visit within 3 mo: Visit date not found  Next physical within 3 mo: Visit date not found      [facility] Conte Connection Triage/Med Refill 7/3/2021    Requested Prescriptions   Pending Prescriptions Disp Refills   ? escitalopram oxalate (LEXAPRO) 20 MG tablet [Pharmacy Med Name: Escitalopram Oxalate 20 MG Oral Tablet] 30 tablet 0     Sig: Take 1 tablet by mouth once daily       SSRI Refill Protocol  Failed - 7/3/2021  3:18 PM        Failed - PCP or prescribing provider visit in last year     Last office visit with prescriber/PCP: 1/10/2019 Nora Meza MD OR same dept: Visit date not found OR same specialty: 1/10/2019 Nora Meza MD  Last physical: 10/24/2019 Last MTM visit: Visit date not found   Next visit within 3 mo: Visit date not found  Next physical within 3 mo: Visit date not found  Prescriber OR PCP: Nora Meza MD  Last diagnosis associated with med order: 1. Anxiety  - escitalopram oxalate (LEXAPRO) 20 MG tablet [Pharmacy Med Name: Escitalopram Oxalate 20 MG Oral Tablet]; Take 1 tablet by mouth once daily  Dispense: 30 tablet; Refill: 0    If protocol passes may refill for 12 months if within 3 months of last provider visit (or a total of 15 months).             Failed - Age 21 and younger route to prescribing provider     Last office visit with prescriber/PCP: 1/10/2019 Nora Meza MD OR same dept: Visit date not found  OR same specialty: 1/10/2019 Nora Meza MD  Last physical: 10/24/2019 Last MTM visit: Visit date not found   Next visit within 3 mo: Visit date not found  Next physical within 3 mo: Visit date not found  Prescriber OR PCP: Nora Meza MD  Last diagnosis associated with med order: 1. Anxiety  - escitalopram oxalate (LEXAPRO) 20 MG tablet [Pharmacy Med Name: Escitalopram Oxalate 20 MG Oral Tablet]; Take 1 tablet by mouth once daily  Dispense: 30 tablet; Refill: 0    If protocol passes may refill for 12 months if within 3 months of last provider visit (or a total of 15 months).

## 2021-07-05 NOTE — TELEPHONE ENCOUNTER
Telephone Encounter by Vadim Gar MD at 7/5/2021  1:37 PM     Author: Vadim Gar MD Service: -- Author Type: Physician    Filed: 7/5/2021  1:37 PM Encounter Date: 7/3/2021 Status: Signed    : Vadim Gar MD (Physician)       Please let family know that Dr. Meza will not fill this because she hasn't seen her since 2019

## 2021-07-06 NOTE — TELEPHONE ENCOUNTER
Telephone Encounter by Yareli Lindsey LPN at 7/6/2021  8:35 AM     Author: Yareli Lindsey LPN Service: -- Author Type: Licensed Nurse    Filed: 7/6/2021  8:35 AM Encounter Date: 7/3/2021 Status: Signed    : Yareli Lindsey LPN (Licensed Nurse)       Left message to call back for: patient  Information to relay to patient:  Please give message below.  It also looks like patient is being seen at [facility] now, is this true?  If so please update this message so the PCP can be updated.  Thanks.